# Patient Record
Sex: MALE | Race: WHITE | NOT HISPANIC OR LATINO | URBAN - METROPOLITAN AREA
[De-identification: names, ages, dates, MRNs, and addresses within clinical notes are randomized per-mention and may not be internally consistent; named-entity substitution may affect disease eponyms.]

---

## 2017-04-17 ENCOUNTER — TELEPHONE (OUTPATIENT)
Dept: FAMILY MEDICINE | Facility: CLINIC | Age: 47
End: 2017-04-17

## 2017-04-17 DIAGNOSIS — R00.0 SINUS TACHYCARDIA: ICD-10-CM

## 2017-04-17 DIAGNOSIS — F33.1 MAJOR DEPRESSIVE DISORDER, RECURRENT EPISODE, MODERATE (H): ICD-10-CM

## 2017-04-17 RX ORDER — ATENOLOL 50 MG/1
50 TABLET ORAL DAILY
Qty: 30 TABLET | Refills: 0 | Status: SHIPPED | OUTPATIENT
Start: 2017-04-17 | End: 2017-05-05

## 2017-04-17 RX ORDER — CITALOPRAM HYDROBROMIDE 40 MG/1
40 TABLET ORAL DAILY
Qty: 30 TABLET | Refills: 0 | Status: SHIPPED | OUTPATIENT
Start: 2017-04-17 | End: 2017-05-05

## 2017-04-17 ASSESSMENT — PATIENT HEALTH QUESTIONNAIRE - PHQ9: 5. POOR APPETITE OR OVEREATING: SEVERAL DAYS

## 2017-04-17 ASSESSMENT — ANXIETY QUESTIONNAIRES
3. WORRYING TOO MUCH ABOUT DIFFERENT THINGS: SEVERAL DAYS
7. FEELING AFRAID AS IF SOMETHING AWFUL MIGHT HAPPEN: SEVERAL DAYS
GAD7 TOTAL SCORE: 6
6. BECOMING EASILY ANNOYED OR IRRITABLE: SEVERAL DAYS
5. BEING SO RESTLESS THAT IT IS HARD TO SIT STILL: NOT AT ALL
2. NOT BEING ABLE TO STOP OR CONTROL WORRYING: SEVERAL DAYS
1. FEELING NERVOUS, ANXIOUS, OR ON EDGE: SEVERAL DAYS

## 2017-04-17 NOTE — TELEPHONE ENCOUNTER
Pt is calling out of town working in [place] and wondering if he can get refills until he gets back home and can schedule an appt.      atenolol (TENORMIN) 50 MG tablet        Last Written Prescription Date: 04/04/16  Last Fill Quantity: 90, # refills: 3    Last Office Visit with [facility], [facility] or [facility] prescribing provider:  06/13/16   Future Office Visit:        BP Readings from Last 3 Encounters:   06/13/16 120/80   05/23/16 124/81   04/04/16 128/80       citalopram (CELEXA) 40 MG tablet       Last Written Prescription Date: 04/04/16  Last Fill Quantity: 90, # refills: 3  Last Office Visit with [facility] primary care provider:  06/13/16        Last PHQ-9 score on record=   PHQ-9 SCORE 6/13/2016   Total Score -   Total Score 17         [facility] [place]

## 2017-04-17 NOTE — TELEPHONE ENCOUNTER
Pt out of town working stating that he will return in the next 30 days.   A 30 day refill was sent.   Pt states he will stop by and have his BP checked and make an appt with in the next 60 days.     PHQ-9 SCORE 2/11/2016 6/13/2016 4/17/2017   Total Score - - -   Total Score 18 17 5      GILBERTO-7 SCORE 2/11/2016 6/13/2016 4/17/2017   Total Score 13 15 6      Encounter closed.   Katie OWENS RN

## 2017-04-18 ASSESSMENT — PATIENT HEALTH QUESTIONNAIRE - PHQ9: SUM OF ALL RESPONSES TO PHQ QUESTIONS 1-9: 5

## 2017-04-18 ASSESSMENT — ANXIETY QUESTIONNAIRES: GAD7 TOTAL SCORE: 6

## 2017-05-05 ENCOUNTER — OFFICE VISIT (OUTPATIENT)
Dept: FAMILY MEDICINE | Facility: CLINIC | Age: 47
End: 2017-05-05
Payer: COMMERCIAL

## 2017-05-05 VITALS
DIASTOLIC BLOOD PRESSURE: 79 MMHG | HEART RATE: 84 BPM | WEIGHT: 246 LBS | TEMPERATURE: 97.9 F | SYSTOLIC BLOOD PRESSURE: 119 MMHG | BODY MASS INDEX: 35.3 KG/M2 | OXYGEN SATURATION: 94 %

## 2017-05-05 DIAGNOSIS — F33.1 MAJOR DEPRESSIVE DISORDER, RECURRENT EPISODE, MODERATE (H): ICD-10-CM

## 2017-05-05 DIAGNOSIS — E66.01 MORBID OBESITY DUE TO EXCESS CALORIES (H): ICD-10-CM

## 2017-05-05 DIAGNOSIS — R00.0 SINUS TACHYCARDIA: ICD-10-CM

## 2017-05-05 PROCEDURE — 99213 OFFICE O/P EST LOW 20 MIN: CPT | Performed by: FAMILY MEDICINE

## 2017-05-05 RX ORDER — CITALOPRAM HYDROBROMIDE 40 MG/1
40 TABLET ORAL DAILY
Qty: 90 TABLET | Refills: 3 | Status: SHIPPED | OUTPATIENT
Start: 2017-05-05 | End: 2018-05-21

## 2017-05-05 RX ORDER — ATENOLOL 50 MG/1
50 TABLET ORAL DAILY
Qty: 90 TABLET | Refills: 3 | Status: SHIPPED | OUTPATIENT
Start: 2017-05-05 | End: 2017-08-29

## 2017-05-05 ASSESSMENT — PATIENT HEALTH QUESTIONNAIRE - PHQ9: 5. POOR APPETITE OR OVEREATING: SEVERAL DAYS

## 2017-05-05 ASSESSMENT — PAIN SCALES - GENERAL: PAINLEVEL: NO PAIN (0)

## 2017-05-05 ASSESSMENT — ANXIETY QUESTIONNAIRES
3. WORRYING TOO MUCH ABOUT DIFFERENT THINGS: SEVERAL DAYS
1. FEELING NERVOUS, ANXIOUS, OR ON EDGE: SEVERAL DAYS
IF YOU CHECKED OFF ANY PROBLEMS ON THIS QUESTIONNAIRE, HOW DIFFICULT HAVE THESE PROBLEMS MADE IT FOR YOU TO DO YOUR WORK, TAKE CARE OF THINGS AT HOME, OR GET ALONG WITH OTHER PEOPLE: NOT DIFFICULT AT ALL
2. NOT BEING ABLE TO STOP OR CONTROL WORRYING: SEVERAL DAYS
6. BECOMING EASILY ANNOYED OR IRRITABLE: SEVERAL DAYS
GAD7 TOTAL SCORE: 5
7. FEELING AFRAID AS IF SOMETHING AWFUL MIGHT HAPPEN: NOT AT ALL
5. BEING SO RESTLESS THAT IT IS HARD TO SIT STILL: NOT AT ALL

## 2017-05-05 NOTE — MR AVS SNAPSHOT
"              After Visit Summary   2017    Justo Mendez    MRN: [id number]           Patient Information     Date Of Birth          1970        Visit Information        Provider Department      2017 1:20 PM Ivet Navarro MD [facility]        Today's Diagnoses     Sinus tachycardia        Major depressive disorder, recurrent episode, moderate (H)           Follow-ups after your visit        Who to contact     If you have questions or need follow up information about today's clinic visit or your schedule please contact [facility] directly at [phone].  Normal or non-critical lab and imaging results will be communicated to you by Smart [employer], letter or phone within 4 business days after the clinic has received the results. If you do not hear from us within 7 days, please contact the clinic through Smart [employer] or phone. If you have a critical or abnormal lab result, we will notify you by phone as soon as possible.  Submit refill requests through [employer] or call your pharmacy and they will forward the refill request to us. Please allow 3 business days for your refill to be completed.          Additional Information About Your Visit        MyChart Information     [employer] lets you send messages to your doctor, view your test results, renew your prescriptions, schedule appointments and more. To sign up, go to [email or web address] . Click on \"Log in\" on the left side of the screen, which will take you to the Welcome page. Then click on \"Sign up Now\" on the right side of the page.     You will be asked to enter the access code listed below, as well as some personal information. Please follow the directions to create your username and password.     Your access code is: [email or web address]  Expires: 8/3/2017  1:38 PM     Your access code will  in 90 days. If you need help or a new code, please call your [facility] or [phone].        Care EveryWhere ID     This " is your Care EveryWhere ID. This could be used by other organizations to access your [place] medical records  [id number]        Your Vitals Were     Pulse Temperature Pulse Oximetry BMI (Body Mass Index)          84 97.9  F (36.6  C) (Tympanic) 94% 35.3 kg/m2         Blood Pressure from Last 3 Encounters:   05/05/17 119/79   06/13/16 120/80   05/23/16 124/81    Weight from Last 3 Encounters:   05/05/17 246 lb (111.6 kg)   06/13/16 250 lb 12.8 oz (113.8 kg)   05/23/16 258 lb (117 kg)              We Performed the Following     DEPRESSION ACTION PLAN (DAP)          Where to get your medicines      These medications were sent to [facility] [address] [place] [address] [address] [facility]  [address] [facility] [address]     Phone:  [phone]     atenolol 50 MG tablet    citalopram 40 MG tablet          Primary Care Provider Office Phone # Fax #    R Regulo Muse [phone]       [place] [address]        Thank you!     Thank you for choosing [facility]  for your care. Our goal is always to provide you with excellent care. Hearing back from our patients is one way we can continue to improve our services. Please take a few minutes to complete the written survey that you may receive in the mail after your visit with us. Thank you!             Your Updated Medication List - Protect others around you: Learn how to safely use, store and throw away your medicines at www.disposemymeds.org.          This list is accurate as of: 5/5/17  1:38 PM.  Always use your most recent med list.                   Brand Name Dispense Instructions for use    atenolol 50 MG tablet    TENORMIN    90 tablet    Take 1 tablet (50 mg) by mouth daily       citalopram 40 MG tablet    celeXA    90 tablet    Take 1 tablet (40 mg) by mouth daily

## 2017-05-05 NOTE — NURSING NOTE
"Chief Complaint   Patient presents with     Recheck Medication     bp       Initial /79 (BP Location: Right arm, Patient Position: Chair, Cuff Size: Adult Large)  Pulse 84  Temp 97.9  F (36.6  C) (Tympanic)  Wt 246 lb (111.6 kg)  SpO2 94%  BMI 35.3 kg/m2 Estimated body mass index is 35.3 kg/(m^2) as calculated from the following:    Height as of 6/13/16: 5' 10\" (1.778 m).    Weight as of this encounter: 246 lb (111.6 kg).  Medication Reconciliation: complete   Sayra Bashir CMA       "

## 2017-05-05 NOTE — PROGRESS NOTES
SUBJECTIVE:                                                    Justo Mendez is a 47 year old male who presents to clinic today for the following health issues:      Hypertension Follow-up      Outpatient blood pressures are not being checked.    Low Salt Diet: low salt       Amount of exercise or physical activity: 2-3 days/week for an average of 30-45 minutes    Problems taking medications regularly: No    Medication side effects: none    Diet: low salt    Medication Followup of atenolo,citalopram    Taking Medication as prescribed: yes    Side Effects:  None    Medication Helping Symptoms:  yes     Justo Mendez is a 47 year old male who has been on citalopram for many years with good control of depression, had recurrent symptoms in the past when he tried to wean this drug. He needs a year's refill.    He is also on atenolol 50 mg for episodes of sinus tachycardia and borderline hypertension, denies any adverse effects.    He denies chest pains or breathing problems, does use CPAP for sleep apnea. He has been getting more exercise and trying to eat better, has lost weight since last year.    /79 (BP Location: Right arm, Patient Position: Chair, Cuff Size: Adult Large)  Pulse 84  Temp 97.9  F (36.6  C) (Tympanic)  Wt 246 lb (111.6 kg)  SpO2 94%  BMI 35.3 kg/m2    Wt Readings from Last 4 Encounters:   05/05/17 246 lb (111.6 kg)   06/13/16 250 lb 12.8 oz (113.8 kg)   05/23/16 258 lb (117 kg)   04/04/16 263 lb 3.2 oz (119.4 kg)     PHQ-9 ([employer]) PHQ-9 Total Score   2/11/2016 18   6/13/2016 17   4/17/2017 5   5/5/2017 5     Most recent labs:    Component      Latest Ref Rng & Units 5/23/2016   Sodium      133 - 144 mmol/L 134   Potassium      3.4 - 5.3 mmol/L 3.9   Chloride      94 - 109 mmol/L 102   Carbon Dioxide      20 - 32 mmol/L 23   Anion Gap      3 - 14 mmol/L 9   Glucose      70 - 99 mg/dL 51 (L)   Urea Nitrogen      7 - 30 mg/dL 6 (L)   Creatinine      0.66 - 1.25 mg/dL 0.69   GFR  Estimate      >60 mL/min/1.7m2 >90 . . .   GFR Estimate If Black      >60 mL/min/1.7m2 >90 . . .   Calcium      8.5 - 10.1 mg/dL 8.9   Hemoglobin A1C      4.3 - 6.0 % 5.9       ASSESSMENT: chronic depression, controlled       Tachycardia, controlled       Morbid obesity (BMI>35 with sleep apnea)       History of borderline A1C    PLAN: Renew citalopram and atenolol at current doses.   He is congratulated on his weight loss, encouraged further loss.   He was advised that next year, at age 48, he should plan a full PE and would be due for routine colon screening.  At that time it would be reasonable to repeat the A1C and check a lipid screen.    Ivet Navarro md    PLAN:

## 2017-05-06 ASSESSMENT — ANXIETY QUESTIONNAIRES: GAD7 TOTAL SCORE: 5

## 2017-05-06 ASSESSMENT — PATIENT HEALTH QUESTIONNAIRE - PHQ9: SUM OF ALL RESPONSES TO PHQ QUESTIONS 1-9: 5

## 2017-08-29 ENCOUNTER — TELEPHONE (OUTPATIENT)
Dept: FAMILY MEDICINE | Facility: CLINIC | Age: 47
End: 2017-08-29

## 2017-08-29 DIAGNOSIS — R00.0 SINUS TACHYCARDIA: Primary | ICD-10-CM

## 2017-08-29 RX ORDER — METOPROLOL SUCCINATE 50 MG/1
50 TABLET, EXTENDED RELEASE ORAL DAILY
Qty: 90 TABLET | Refills: 1 | Status: SHIPPED | OUTPATIENT
Start: 2017-08-29 | End: 2017-09-05

## 2017-08-29 NOTE — TELEPHONE ENCOUNTER
Can you please advise on medication change for pt as Atenolol is on backorder in all doses?    Thank you.    Cheryl WEEMS RN

## 2017-08-29 NOTE — TELEPHONE ENCOUNTER
Reason for Call:  Other prescription    Detailed comments: Fax rec'd from [facility] in [place] - Atenolol 50mg tablets are on backorder and new dose of Atenolol or different BP med needs to be sent to pharmacy.  Fax given to Clinic RN     Call taken on 8/29/2017 at 2:04 PM by Milly Ballesteros

## 2017-08-30 NOTE — TELEPHONE ENCOUNTER
Patient returned our call, and I gave him the message.  [facility]  [facility]  [facility]

## 2017-08-31 ENCOUNTER — TELEPHONE (OUTPATIENT)
Dept: FAMILY MEDICINE | Facility: CLINIC | Age: 47
End: 2017-08-31

## 2017-08-31 DIAGNOSIS — R00.0 SINUS TACHYCARDIA: ICD-10-CM

## 2017-08-31 RX ORDER — METOPROLOL SUCCINATE 50 MG/1
50 TABLET, EXTENDED RELEASE ORAL DAILY
Qty: 90 TABLET | Refills: 1 | Status: CANCELLED | OUTPATIENT
Start: 2017-08-31

## 2017-08-31 NOTE — TELEPHONE ENCOUNTER
Pharmacy note:   This medication is $82. Spoke with patient, he said be'd be ok with taking the Tartrate formulation BID since then it'd only be $10. Please authorize if appropriate.

## 2017-09-05 RX ORDER — METOPROLOL TARTRATE 25 MG/1
25 TABLET, FILM COATED ORAL 2 TIMES DAILY
Qty: 180 TABLET | Refills: 1 | Status: SHIPPED | OUTPATIENT
Start: 2017-09-05 | End: 2018-05-21

## 2017-09-05 NOTE — TELEPHONE ENCOUNTER
Patient is now out of this medication, can we please expedite today, to the pharmacy listed in his chart in [place].  [facility]  [facility] [place] [facility]

## 2018-05-21 ENCOUNTER — TELEPHONE (OUTPATIENT)
Dept: FAMILY MEDICINE | Facility: CLINIC | Age: 48
End: 2018-05-21

## 2018-05-21 DIAGNOSIS — F33.1 MAJOR DEPRESSIVE DISORDER, RECURRENT EPISODE, MODERATE (H): ICD-10-CM

## 2018-05-21 DIAGNOSIS — R00.0 SINUS TACHYCARDIA: ICD-10-CM

## 2018-05-21 RX ORDER — CITALOPRAM HYDROBROMIDE 40 MG/1
40 TABLET ORAL DAILY
Qty: 30 TABLET | Refills: 0 | Status: SHIPPED | OUTPATIENT
Start: 2018-05-21 | End: 2018-06-19

## 2018-05-21 RX ORDER — METOPROLOL TARTRATE 25 MG/1
25 TABLET, FILM COATED ORAL 2 TIMES DAILY
Qty: 60 TABLET | Refills: 0 | Status: SHIPPED | OUTPATIENT
Start: 2018-05-21 | End: 2018-06-19

## 2018-05-21 NOTE — TELEPHONE ENCOUNTER
Last Written Prescription Date:  Metoprolol  9/5/2017  Last Fill Quantity: 180,  # refills: 1   Last office visit: 5/5/2017 with prescribing provider:  Dr. Muse   Future Office Visit:      Last Written Prescription Date:  Celexa  9/5/2017  Last Fill Quantity: 90,  # refills: 3   Last office visit: 5/5/2017 with prescribing provider:  Dr. Muse   Future Office Visit:      Patient is in [place] and is almost out of these medications. Please send to [facility] in [place], [place]. Only [facility] in [place].  [facility] [place] [facility]

## 2018-06-19 DIAGNOSIS — F33.1 MAJOR DEPRESSIVE DISORDER, RECURRENT EPISODE, MODERATE (H): ICD-10-CM

## 2018-06-19 DIAGNOSIS — R00.0 SINUS TACHYCARDIA: ICD-10-CM

## 2018-06-19 RX ORDER — CITALOPRAM HYDROBROMIDE 40 MG/1
40 TABLET ORAL DAILY
Qty: 30 TABLET | Refills: 0 | Status: SHIPPED | OUTPATIENT
Start: 2018-06-19

## 2018-06-19 RX ORDER — METOPROLOL TARTRATE 25 MG/1
25 TABLET, FILM COATED ORAL 2 TIMES DAILY
Qty: 60 TABLET | Refills: 0 | Status: SHIPPED | OUTPATIENT
Start: 2018-06-19

## 2018-06-19 NOTE — TELEPHONE ENCOUNTER
Reason for Call:  Medication or medication refill:    Do you use a [facility]?  Name of the pharmacy and phone number for the current request:  [facility]    Name of the medication requested: Pt states that he moved to [place] and states that he cannot get in to see a dr for another month and also states that he doesn't have insurance.  I reminded pt that he called last month for a refill and was given a 1 mo refill until he could get in to see a Dr.    He is asking for another month of Metoprolol and Citalopram.  I did tell pt that he has not been seen for a year in clinic, he then asked me to ask Dr. Muse for the refills.  I replied that he has not seen Dr. Muse in clinic for 2 years.  Please call patient and advise.      Metoprolol & Citalopram       Last Written Prescription Date:  5/21/18  Last Fill Quantity: 30/60,   # refills: 0  Last Office Visit: 5/5/17  Future Office visit:       Other request:     Can we leave a detailed message on this number? YES    Phone number patient can be reached at: Cell number on file:    No relevant phone numbers on file.       Best Time: any    Call taken on 6/19/2018 at 10:26 AM by Milly Ballesteros

## 2021-04-29 ENCOUNTER — OFFICE VISIT - HEALTHEAST (OUTPATIENT)
Dept: FAMILY MEDICINE | Facility: CLINIC | Age: 51
End: 2021-04-29

## 2021-04-29 DIAGNOSIS — R00.0 TACHYCARDIA: ICD-10-CM

## 2021-04-29 RX ORDER — ATENOLOL 50 MG/1
50 TABLET ORAL DAILY
Qty: 90 TABLET | Refills: 3 | Status: SHIPPED | OUTPATIENT
Start: 2021-04-29

## 2021-04-29 ASSESSMENT — ANXIETY QUESTIONNAIRES
4. TROUBLE RELAXING: NOT AT ALL
GAD7 TOTAL SCORE: 0
6. BECOMING EASILY ANNOYED OR IRRITABLE: NOT AT ALL
IF YOU CHECKED OFF ANY PROBLEMS ON THIS QUESTIONNAIRE, HOW DIFFICULT HAVE THESE PROBLEMS MADE IT FOR YOU TO DO YOUR WORK, TAKE CARE OF THINGS AT HOME, OR GET ALONG WITH OTHER PEOPLE: NOT DIFFICULT AT ALL
1. FEELING NERVOUS, ANXIOUS, OR ON EDGE: NOT AT ALL
5. BEING SO RESTLESS THAT IT IS HARD TO SIT STILL: NOT AT ALL
2. NOT BEING ABLE TO STOP OR CONTROL WORRYING: NOT AT ALL
3. WORRYING TOO MUCH ABOUT DIFFERENT THINGS: NOT AT ALL
7. FEELING AFRAID AS IF SOMETHING AWFUL MIGHT HAPPEN: NOT AT ALL

## 2021-04-29 ASSESSMENT — MIFFLIN-ST. JEOR: SCORE: 1870.37

## 2021-04-29 ASSESSMENT — PATIENT HEALTH QUESTIONNAIRE - PHQ9: SUM OF ALL RESPONSES TO PHQ QUESTIONS 1-9: 0

## 2021-05-27 ASSESSMENT — PATIENT HEALTH QUESTIONNAIRE - PHQ9: SUM OF ALL RESPONSES TO PHQ QUESTIONS 1-9: 0

## 2021-05-28 ASSESSMENT — ANXIETY QUESTIONNAIRES: GAD7 TOTAL SCORE: 0

## 2021-06-05 VITALS
OXYGEN SATURATION: 97 % | HEIGHT: 69 IN | SYSTOLIC BLOOD PRESSURE: 122 MMHG | DIASTOLIC BLOOD PRESSURE: 84 MMHG | BODY MASS INDEX: 33.67 KG/M2 | HEART RATE: 86 BPM | WEIGHT: 227.3 LBS

## 2021-06-16 PROBLEM — R00.0 TACHYCARDIA: Status: ACTIVE | Noted: 2021-04-29

## 2021-06-17 NOTE — PROGRESS NOTES
"    Assessment & Plan     Tachycardia    He is very well managed on this metoprolol, so we will continue that going for him.  I did provide him a 90-day refill with 3 refills.  He does have a follow-up with a cardiologist as well.  If he has any other needs in the meantime he will let us know.  I did suggest a physical if he wants to come in fasting at some point to do that as well.      - atenoloL (TENORMIN) 50 MG tablet; Take 1 tablet (50 mg total) by mouth daily.    Review of prior external note(s) from - Outside records from cardiology  Prescription drug management  [id number]}     Tobacco Cessation:   reports that he has been smoking cigarettes. He has a 12.50 pack-year smoking history. He has never used smokeless tobacco.    BMI:   Estimated body mass index is 33.32 kg/m  as calculated from the following:    Height as of this encounter: 5' 9.25\" (1.759 m).    Weight as of this encounter: 227 lb 4.8 oz (103.1 kg).       No follow-ups on file.    Raj Vasquez MD  [facility]   Justo Mendez is 50 y.o. and presents today for the following health issues   HPI     Patient is a new patient to our clinic was here to establish care and to get a refill of medication.  He takes atenolol for some presumed paroxysmal supraventricular tachycardia although we are in the process of trying to get old records to affirm that.  He reports that he was put on this a few years ago by cardiology after having a bit of a work-up done for his rapid heart rate.  He does very well on the atenolol and it controls things very well for him and he has no side effects or problems to it.  He is not been seen for a physical in some time and I reminded him that he could certainly come in and do that as well.        Review of Systems    A comprehensive Review of Systems was performed, and other than the positives mentioned above, the ROS was negative.       Objective    /84 (Patient Site: Left " "Arm, Patient Position: Sitting, Cuff Size: Adult Large)   Pulse 86   Ht 5' 9.25\" (1.759 m)   Wt (!) 227 lb 4.8 oz (103.1 kg)   SpO2 97%   BMI 33.32 kg/m    Body mass index is 33.32 kg/m .  Physical Exam    General: Awake, Alert and Cooperative   Head: Normocephalic and Atraumatic   Eyes: PERRL, EOMI.   ENT: Normal pearly TMs bilaterally and Oropharynx clear   Neck: Supple and Thyroid without enlargement or nodules   Chest: Chest wall normal   Lungs: Clear to auscultation bilaterally   Heart:: Regular rate and rhythm and no murmurs.  No significant LE edema.   Abdomen: Soft, nontender, nondistended and no hepatosplenomegaly   Musculoskeletal: Moving all extremities and No pain in the extremities   Neuro: Alert and oriented times 3 and Grossly normal   Skin: No rashes or lesions noted                  "